# Patient Record
Sex: FEMALE
[De-identification: names, ages, dates, MRNs, and addresses within clinical notes are randomized per-mention and may not be internally consistent; named-entity substitution may affect disease eponyms.]

---

## 2020-10-17 ENCOUNTER — TRANSCRIPTION ENCOUNTER (OUTPATIENT)
Age: 25
End: 2020-10-17

## 2020-12-21 PROBLEM — Z00.00 ENCOUNTER FOR PREVENTIVE HEALTH EXAMINATION: Status: ACTIVE | Noted: 2020-12-21

## 2023-01-05 ENCOUNTER — APPOINTMENT (OUTPATIENT)
Dept: OBGYN | Facility: CLINIC | Age: 28
End: 2023-01-05
Payer: COMMERCIAL

## 2023-01-05 ENCOUNTER — RESULT REVIEW (OUTPATIENT)
Age: 28
End: 2023-01-05

## 2023-01-05 VITALS
SYSTOLIC BLOOD PRESSURE: 117 MMHG | DIASTOLIC BLOOD PRESSURE: 72 MMHG | HEIGHT: 64 IN | BODY MASS INDEX: 26.46 KG/M2 | WEIGHT: 155 LBS

## 2023-01-05 DIAGNOSIS — N83.202 UNSPECIFIED OVARIAN CYST, RIGHT SIDE: ICD-10-CM

## 2023-01-05 DIAGNOSIS — N83.201 UNSPECIFIED OVARIAN CYST, RIGHT SIDE: ICD-10-CM

## 2023-01-05 PROCEDURE — 99203 OFFICE O/P NEW LOW 30 MIN: CPT

## 2023-01-14 NOTE — PLAN
[FreeTextEntry1] : Discussed the management of ovarian cysts at length.  Risks including but not limited to torsion, rupture and malignancy were discussed.  With findings consistent with probable functional ovarian cyst will continue with continued conservative followup with sonography.  Pt to have fu sono in feb and will discuss further mgt after results.\par During this visit 40 minutes were spent face-to-face with greater than 50% of the time dedicated to counseling.\par \par  Griseofulvin Counseling:  I discussed with the patient the risks of griseofulvin including but not limited to photosensitivity, cytopenia, liver damage, nausea/vomiting and severe allergy.  The patient understands that this medication is best absorbed when taken with a fatty meal (e.g., ice cream or french fries).

## 2023-01-14 NOTE — HISTORY OF PRESENT ILLNESS
[FreeTextEntry1] : 28yo G0 LMP 12/7/22 presenting for consultation for bl ovarian cysts. Referred by Dr. Mosley\par \par Patient has had abnormal bleeding for approximately 1 year with some spotting before and after periods which is why she had a TVUS done initially in November.  She denies pain. She otherwise feels well. Periods are regular. \par \par TVUS(12/10/22): Ut 7.9oqu5t9.3cm. EMS 3mm. ROV 9.1oxw3w9.2cm. LOV 3.4x2.7x2.3cm. 8.9zwu0z9.2cm simple appearing R ovarian cyst. There are small peripheral follicular cysts in the L ovary and 2.2cm complex L ovarian cyst demonstrating irregular walls and internal echoes likely resolving prior 5.8cm complex hemorrhagic cyst.\par \par OBHx: P0\par GYNHx: recently tx'd for chlamydia in October \par PMSHx: wisdom teeth removal\par Meds:denies\par All: NKDA\par Soc: social drinker;  in an elementary school, lives by herself. Sexually active with one male partner. Feels safe\par

## 2023-02-27 ENCOUNTER — OUTPATIENT (OUTPATIENT)
Dept: OUTPATIENT SERVICES | Facility: HOSPITAL | Age: 28
LOS: 1 days | End: 2023-02-27
Payer: COMMERCIAL

## 2023-02-27 ENCOUNTER — APPOINTMENT (OUTPATIENT)
Dept: ULTRASOUND IMAGING | Facility: IMAGING CENTER | Age: 28
End: 2023-02-27
Payer: COMMERCIAL

## 2023-02-27 ENCOUNTER — RESULT REVIEW (OUTPATIENT)
Age: 28
End: 2023-02-27

## 2023-02-27 DIAGNOSIS — N83.201 UNSPECIFIED OVARIAN CYST, RIGHT SIDE: ICD-10-CM

## 2023-02-27 PROCEDURE — 76830 TRANSVAGINAL US NON-OB: CPT

## 2023-02-27 PROCEDURE — 76856 US EXAM PELVIC COMPLETE: CPT | Mod: 26

## 2023-02-27 PROCEDURE — 76830 TRANSVAGINAL US NON-OB: CPT | Mod: 26

## 2023-02-27 PROCEDURE — 76856 US EXAM PELVIC COMPLETE: CPT

## 2023-03-02 DIAGNOSIS — Z87.42 PERSONAL HISTORY OF OTHER DISEASES OF THE FEMALE GENITAL TRACT: ICD-10-CM

## 2023-03-03 ENCOUNTER — TRANSCRIPTION ENCOUNTER (OUTPATIENT)
Age: 28
End: 2023-03-03